# Patient Record
Sex: MALE | URBAN - METROPOLITAN AREA
[De-identification: names, ages, dates, MRNs, and addresses within clinical notes are randomized per-mention and may not be internally consistent; named-entity substitution may affect disease eponyms.]

---

## 2021-05-24 ENCOUNTER — APPOINTMENT (RX ONLY)
Dept: URBAN - METROPOLITAN AREA CLINIC 133 | Facility: CLINIC | Age: 26
Setting detail: DERMATOLOGY
End: 2021-05-24

## 2021-05-24 DIAGNOSIS — M7120 SYNOVIAL CYST OF POPLITEAL SPACE: ICD-10-CM

## 2021-05-24 PROBLEM — M71.30 OTHER BURSAL CYST, UNSPECIFIED SITE: Status: ACTIVE | Noted: 2021-05-24

## 2021-05-24 PROCEDURE — ? COUNSELING

## 2021-05-24 PROCEDURE — 99202 OFFICE O/P NEW SF 15 MIN: CPT

## 2021-05-24 NOTE — PROCEDURE: COUNSELING
Patient Specific Counseling (Will Not Stick From Patient To Patient): Patient is being referred out to the orthopedic surgeon because of the left ulnar nerve parasthesia and fixation of the cystic nodule 1.5 cm in diameter. Being attached to the underlying soft tissue of the region of the ulnar bone. Patient states that he also had a lesion of the right index finger which he ruptured several weeks ago. There is a vague history of trauma to the left elbow several weeks ago. Patient was referred to [facility] [phone] Patient Specific Counseling (Will Not Stick From Patient To Patient): Patient is being referred out to the orthopedic surgeon because of the left ulnar nerve parasthesia and fixation of the cystic nodule 1.5 cm in diameter. Being attached to the underlying soft tissue of the region of the ulnar bone. Patient states that he also had a lesion of the right index finger which he ruptured several weeks ago. There is a vague history of trauma to the left elbow several weeks ago. Patient was referred to [facility] [phone]